# Patient Record
Sex: FEMALE | Race: WHITE | NOT HISPANIC OR LATINO | URBAN - METROPOLITAN AREA
[De-identification: names, ages, dates, MRNs, and addresses within clinical notes are randomized per-mention and may not be internally consistent; named-entity substitution may affect disease eponyms.]

---

## 2017-12-16 ENCOUNTER — EMERGENCY (EMERGENCY)
Facility: HOSPITAL | Age: 27
LOS: 1 days | Discharge: HOME | End: 2017-12-16
Attending: EMERGENCY MEDICINE | Admitting: EMERGENCY MEDICINE
Payer: SELF-PAY

## 2017-12-16 VITALS
DIASTOLIC BLOOD PRESSURE: 65 MMHG | WEIGHT: 119.93 LBS | TEMPERATURE: 98 F | RESPIRATION RATE: 17 BRPM | HEIGHT: 63 IN | SYSTOLIC BLOOD PRESSURE: 120 MMHG | OXYGEN SATURATION: 100 % | HEART RATE: 66 BPM

## 2017-12-16 PROCEDURE — 90471 IMMUNIZATION ADMIN: CPT | Mod: LT

## 2017-12-16 PROCEDURE — 99283 EMERGENCY DEPT VISIT LOW MDM: CPT | Mod: 25

## 2017-12-16 PROCEDURE — 99284 EMERGENCY DEPT VISIT MOD MDM: CPT

## 2017-12-16 PROCEDURE — 90715 TDAP VACCINE 7 YRS/> IM: CPT

## 2017-12-16 RX ORDER — TETANUS TOXOID, REDUCED DIPHTHERIA TOXOID AND ACELLULAR PERTUSSIS VACCINE, ADSORBED 5; 2.5; 8; 8; 2.5 [IU]/.5ML; [IU]/.5ML; UG/.5ML; UG/.5ML; UG/.5ML
0.5 SUSPENSION INTRAMUSCULAR ONCE
Qty: 0 | Refills: 0 | Status: COMPLETED | OUTPATIENT
Start: 2017-12-16 | End: 2017-12-16

## 2017-12-16 RX ORDER — LEVOTHYROXINE SODIUM 125 MCG
0 TABLET ORAL
Qty: 0 | Refills: 0 | COMMUNITY

## 2017-12-16 RX ADMIN — TETANUS TOXOID, REDUCED DIPHTHERIA TOXOID AND ACELLULAR PERTUSSIS VACCINE, ADSORBED 0.5 MILLILITER(S): 5; 2.5; 8; 8; 2.5 SUSPENSION INTRAMUSCULAR at 15:25

## 2017-12-16 NOTE — ED STATDOCS - OBJECTIVE STATEMENT
THIS IS A 26 Y/O F PT WITH NO PERTINENT PMHX PRESENTS TO THE ED C/O DOG BIT TO THE L HAND. SHE EXPLAINS THAT SHE WAS AT WORK WHEN A DOG JUMPED OUT OF THE CAR AND BIT HER ARM. TETANUS IS NOT UTD. DENIES FEVER, CHILLS, CHEST PAIN, SOB, N/V, LACERATIONS, HEADACHE, HEAD TRAUMA, DIFFICULTY URINATING, OR ANY OTHER COMPLAINTS. NKDA.

## 2017-12-16 NOTE — ED ADULT TRIAGE NOTE - CHIEF COMPLAINT QUOTE
Pt axox3 s/p dog bit to left wrist. Site is intact, only redness present.  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  Pt unaware if dog was up to date with vacccines

## 2017-12-16 NOTE — ED STATDOCS - LACERATION LENGTH DESCRIPTION FT
[id number] ERYTHEMATOUS AREA TO THE DISTAL FOREARM WITH 2-3MM PUNCTURE MARKS WITH NO BREAKS TO THE SKIN

## 2017-12-16 NOTE — ED STATDOCS - NS_ ATTENDINGSCRIBEDETAILS _ED_A_ED_FT
I, Wendy Lima, performed the initial face to face bedside interview with this patient regarding history of present illness, review of symptoms and relevant past medical, social and family history.  I completed an independent physical examination.  I was the initial provider who evaluated this patient. I have signed out the follow up of any pending tests (i.e. labs, radiological studies) to the ACP.  I have communicated the patient’s plan of care and disposition with the ACP.  The history, relevant review of systems, past medical and surgical history, medical decision making, and physical examination was documented by the scribe in my presence and I attest to the accuracy of the documentation.

## 2017-12-16 NOTE — ED ADULT NURSE NOTE - OBJECTIVE STATEMENT
pt presents to ED with swelling to left wrist s/p dog bite. no laceration noted. no bleeding noted. small superficial abrasion noted. pt is unaware if dog has all of its shots. a&ox3. will continue to monitor and reassess

## 2024-01-17 NOTE — ED STATDOCS - CPE ED MUSC NORM
Spoke with patient and informed them of message per covering provider. Patient verbalized understanding. Pt didn't have any questions at this time.    normal...